# Patient Record
Sex: MALE | Race: WHITE | HISPANIC OR LATINO | URBAN - METROPOLITAN AREA
[De-identification: names, ages, dates, MRNs, and addresses within clinical notes are randomized per-mention and may not be internally consistent; named-entity substitution may affect disease eponyms.]

---

## 2022-11-01 ENCOUNTER — OFFICE VISIT (OUTPATIENT)
Dept: URGENT CARE | Facility: CLINIC | Age: 32
End: 2022-11-01

## 2022-11-01 VITALS
TEMPERATURE: 97.2 F | OXYGEN SATURATION: 98 % | DIASTOLIC BLOOD PRESSURE: 72 MMHG | HEIGHT: 72 IN | BODY MASS INDEX: 32.64 KG/M2 | HEART RATE: 89 BPM | SYSTOLIC BLOOD PRESSURE: 108 MMHG | RESPIRATION RATE: 16 BRPM | WEIGHT: 241 LBS

## 2022-11-01 DIAGNOSIS — R10.31 RLQ ABDOMINAL PAIN: ICD-10-CM

## 2022-11-01 LAB
APPEARANCE UR: CLEAR
BILIRUB UR STRIP-MCNC: NEGATIVE MG/DL
COLOR UR AUTO: YELLOW
GLUCOSE UR STRIP.AUTO-MCNC: NEGATIVE MG/DL
KETONES UR STRIP.AUTO-MCNC: NEGATIVE MG/DL
LEUKOCYTE ESTERASE UR QL STRIP.AUTO: NEGATIVE
NITRITE UR QL STRIP.AUTO: NEGATIVE
PH UR STRIP.AUTO: 6 [PH] (ref 5–8)
PROT UR QL STRIP: NEGATIVE MG/DL
RBC UR QL AUTO: NEGATIVE
SP GR UR STRIP.AUTO: 1.02
UROBILINOGEN UR STRIP-MCNC: 1 MG/DL

## 2022-11-01 PROCEDURE — 99214 OFFICE O/P EST MOD 30 MIN: CPT | Performed by: NURSE PRACTITIONER

## 2022-11-01 PROCEDURE — 81002 URINALYSIS NONAUTO W/O SCOPE: CPT | Performed by: NURSE PRACTITIONER

## 2022-11-02 ASSESSMENT — ENCOUNTER SYMPTOMS
SHORTNESS OF BREATH: 1
CONSTITUTIONAL NEGATIVE: 1
PSYCHIATRIC NEGATIVE: 1
HEADACHES: 1
EYES NEGATIVE: 1
CARDIOVASCULAR NEGATIVE: 1
ABDOMINAL PAIN: 1
BACK PAIN: 1

## 2022-11-03 NOTE — PROGRESS NOTES
"Subjective:   Norris Gan is a 32 y.o. male who presents for Back Pain, Abdominal Pain (Mainly right side ), and Headache      Patient reports Insidious onset of RLQ pain beginning Wednesday. He states this is progressively getting worse. Patient does not recall anything that he may have done to bring this on.  It started as a 3/10 and now is a 5-6/10.  He states this is intermittent in nature.  He has no appetite and is getting headaches.  He denies fever or chills. He states that sitting makes the pain worse or laying on his right side.  Repositioning makes it better. He denies nausea but does get short of breath.  He feels sometimes he \"can't catch his breath.\"  He reports that he has never had kidney stones. He is having normal bowel movements, the last one was today.  He does have a history of ETOH but discontinued about five months ago.  He also discontinued sports and energy drinks at that time.      Back Pain  This is a new problem. The current episode started in the past 7 days. The problem occurs intermittently. The problem has been gradually worsening since onset. The quality of the pain is described as aching and stabbing. The pain is at a severity of 6/10. The pain is moderate. The symptoms are aggravated by lying down and sitting. Associated symptoms include abdominal pain and headaches. He has tried NSAIDs for the symptoms. The treatment provided no relief.   Abdominal Pain  This is a new problem. The current episode started in the past 7 days. The onset quality is sudden. The problem occurs 2 to 4 times per day. The most recent episode lasted 5 days. The problem has been gradually worsening. The pain is located in the RLQ and RUQ. The pain is at a severity of 6/10. The pain is moderate. The quality of the pain is aching and colicky. The abdominal pain does not radiate. Associated symptoms include headaches. The pain is aggravated by palpation and certain positions. The pain is relieved by Certain " positions. He has tried H2 blockers and antacids for the symptoms. The treatment provided no relief.   Headache    Review of Systems   Constitutional: Negative.    HENT: Negative.     Eyes: Negative.    Respiratory:  Positive for shortness of breath.    Cardiovascular: Negative.    Gastrointestinal:  Positive for abdominal pain.   Genitourinary: Negative.    Musculoskeletal:  Positive for back pain.   Skin: Negative.    Neurological:  Positive for headaches.   Psychiatric/Behavioral: Negative.       Medications, Allergies, and current problem list reviewed today in Epic.     Objective:     /72 (BP Location: Right arm, Patient Position: Sitting, BP Cuff Size: Adult long)   Pulse 89   Temp 36.2 °C (97.2 °F) (Temporal)   Resp 16   Ht 1.829 m (6')   Wt 109 kg (241 lb)   SpO2 98%     Physical Exam  Constitutional:       Appearance: Normal appearance. He is normal weight.   HENT:      Head: Normocephalic and atraumatic.      Mouth/Throat:      Mouth: Mucous membranes are moist.   Eyes:      Conjunctiva/sclera: Conjunctivae normal.      Pupils: Pupils are equal, round, and reactive to light.   Cardiovascular:      Rate and Rhythm: Normal rate and regular rhythm.      Pulses: Normal pulses.   Pulmonary:      Effort: Pulmonary effort is normal.      Breath sounds: Normal breath sounds.   Abdominal:      General: Bowel sounds are normal.      Palpations: Abdomen is soft.      Tenderness: There is abdominal tenderness.      Comments: Tenderness to palpation of the RLQ and RUQ.  No rebound or guarding.  Negative McBurneys.  No masses or organomegaly palpated.  Remainder of the abdominal exam is benign.    Musculoskeletal:         General: Normal range of motion.      Cervical back: Normal range of motion and neck supple.   Skin:     General: Skin is warm and dry.      Capillary Refill: Capillary refill takes less than 2 seconds.   Neurological:      General: No focal deficit present.      Mental Status: He is alert  and oriented to person, place, and time.   Psychiatric:         Mood and Affect: Mood normal.         Behavior: Behavior normal.       Lab Results/POC Test Results   Results for orders placed or performed in visit on 11/01/22   POCT Urinalysis   Result Value Ref Range    POC Color Yellow Negative    POC Appearance Clear Negative    POC Leukocyte Esterase Negative Negative    POC Nitrites Negative Negative    POC Urobiligen 1.0 Negative (0.2) mg/dL    POC Protein Negative Negative mg/dL    POC Urine PH 6.0 5.0 - 8.0    POC Blood Negative Negative    POC Specific Gravity 1.025 <1.005 - >1.030    POC Ketones Negative Negative mg/dL    POC Bilirubin Negative Negative mg/dL    POC Glucose Negative Negative mg/dL             Assessment/Plan:     Diagnosis and associated orders:     1. RLQ abdominal pain  US-ABDOMEN COMPLETE SURVEY    POCT Urinalysis         Comments/MDM:     Differential diagnosis includes appendicitis, cholecystitis, pancreatitis, kidney stones, gastritis, PUD, etc.  Patient is tender to palpation to his right upper quadrant and right lower quadrants.  He has a negative UA.  Patient is not toxic appearing and is in no acute distress. His abdomen is not acute on examination today.  Discussed further workup with patient versus watchful waiting. He reports that he does not have insurance at this time, and would like to keep his costs down.  Abdominal ultrasound was ordered for patient today.  Further treatment recommendations will be made after results are available. Patient was advised that if he develops any sudden fever, chills, worsening pain, or concerning symptoms he should go to the ER for further evaluation.           Differential diagnosis, natural history, supportive care, and indications for immediate follow-up discussed.    Advised the patient to follow-up with the primary care physician for recheck, reevaluation, and consideration of further management.    Please note that this dictation was  created using voice recognition software. I have made a reasonable attempt to correct obvious errors, but I expect that there are errors of grammar and possibly content that I did not discover before finalizing the note.    This note was electronically signed by RAUL Liu

## 2022-11-08 ENCOUNTER — HOSPITAL ENCOUNTER (OUTPATIENT)
Dept: RADIOLOGY | Facility: MEDICAL CENTER | Age: 32
End: 2022-11-08
Attending: NURSE PRACTITIONER

## 2022-11-08 DIAGNOSIS — R10.31 RLQ ABDOMINAL PAIN: ICD-10-CM

## 2022-11-08 PROCEDURE — 76700 US EXAM ABDOM COMPLETE: CPT
